# Patient Record
Sex: FEMALE | Race: AMERICAN INDIAN OR ALASKA NATIVE
[De-identification: names, ages, dates, MRNs, and addresses within clinical notes are randomized per-mention and may not be internally consistent; named-entity substitution may affect disease eponyms.]

---

## 2018-01-27 ENCOUNTER — HOSPITAL ENCOUNTER (EMERGENCY)
Dept: HOSPITAL 31 - C.ER | Age: 38
Discharge: HOME | End: 2018-01-27
Payer: MEDICAID

## 2018-01-27 VITALS
OXYGEN SATURATION: 100 % | SYSTOLIC BLOOD PRESSURE: 139 MMHG | DIASTOLIC BLOOD PRESSURE: 67 MMHG | HEART RATE: 65 BPM | RESPIRATION RATE: 18 BRPM | TEMPERATURE: 98.5 F

## 2018-01-27 VITALS — BODY MASS INDEX: 29.7 KG/M2

## 2018-01-27 DIAGNOSIS — T78.40XA: Primary | ICD-10-CM

## 2018-01-27 NOTE — C.PDOC
History Of Present Illness


37 year old female presents to the ER with a complaint of a mild cough. Patient 

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 

environmental allergies for which she took claritin with relief of symptoms. 

Patient claims she went into anaphylactic shock today but her story does not 

fit the description. Denies chest pain, SOB, or difficulty swallowing.


Time Seen by Provider: 01/27/18 18:28


Chief Complaint (Nursing): Allergic Reaction


History Per: Patient


History/Exam Limitations: no limitations


Onset/Duration Of Symptoms: Mins


Current Symptoms Are (Timing): Still Present


Possible Cause: Unknown


Home/EMS Treatment: Other (Claritin)


Recent travel outside of the [place]: No





Past Medical History


Reviewed: Historical Data, Nursing Documentation, Vital Signs


Vital Signs: 


 Last Vital Signs











Temp  98.5 F   01/27/18 18:05


 


Pulse  65   01/27/18 18:05


 


Resp  18   01/27/18 18:05


 


BP  139/67   01/27/18 18:05


 


Pulse Ox  100   01/27/18 18:53














- Medical History


PMH: Back Problems


Family History: States: Unknown Family Hx





- Social History


Hx Tobacco Use: No


Hx Alcohol Use: No


Hx Substance Use: No





- Immunization History


Hx Tetanus Toxoid Vaccination: No


Hx Influenza Vaccination: Yes


Hx Pneumococcal Vaccination: No





Review Of Systems


Constitutional: Negative for: Fever, Chills


ENT: Negative for: Mouth Swelling, Throat Swelling


Cardiovascular: Negative for: Chest Pain, Palpitations


Respiratory: Positive for: Cough.  Negative for: Shortness of Breath


Skin: Positive for: Other (Swelling)





Physical Exam





- Physical Exam


Appears: Non-toxic, No Acute Distress, Other (Obese. Pressured speech. Normal 

voice)


Skin: Normal Color, Warm, Dry


Head: Atraumatic, Normacephalic


Eye(s): bilateral: Normal Inspection


Ear(s): Bilateral: Normal


Oral Mucosa: Moist


Tongue: Normal Appearing, No Swelling


Lips: Normal Appearing, No Swelling


Throat: Normal, No Erythema, No Exudate, No Drooling, No Other (Swelling)


Chest: Symmetrical, No Tenderness


Cardiovascular: Rhythm Regular


Respiratory: Normal Breath Sounds, No Rales, No Rhonchi, No Stridor, No Wheezing


Gastrointestinal/Abdominal: Soft, No Tenderness


Neurological/Psych: Oriented x3, Normal Speech, Other (No focal deficits)





ED Course And Treatment


O2 Sat by Pulse Oximetry: 100 (Room air)


Pulse Ox Interpretation: Normal


Progress Note: Tylenol administered.





Medical Decision Making


Medical Decision Making: 





environmental allergies


improved with Claritin on the job


ok to return to normal duties





Work-limiting allergies may be documented by allergists.





Disposition


Doctor Will See Patient In The: Office


Counseled Patient/Family Regarding: Studies Performed, Diagnosis





- Disposition


Referrals: 


[facility] [Outside]


Disposition: HOME/ ROUTINE


Disposition Time: 18:52


Condition: GOOD


Additional Instructions: 


continue Claritin as needed for your allergy symptoms





Return to normal work duty as able.


Instructions:  Allergies (ED)


Forms:  [facility] (English)





- Clinical Impression


Clinical Impression: 


 Environmental allergies








- Scribe Statement


The provider has reviewed the documentation as recorded by the Scribjacob Garcia





All medical record entries made by the Scribe were at my direction and 

personally dictated by me. I have reviewed the chart and agree that the record 

accurately reflects my personal performance of the history, physical exam, 

medical decision making, and the department course for this patient. I have 

also personally directed, reviewed, and agree with the discharge instructions 

and disposition.